# Patient Record
Sex: MALE | Race: OTHER | Employment: UNEMPLOYED | URBAN - METROPOLITAN AREA
[De-identification: names, ages, dates, MRNs, and addresses within clinical notes are randomized per-mention and may not be internally consistent; named-entity substitution may affect disease eponyms.]

---
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
## 2023-11-18 ENCOUNTER — HOSPITAL ENCOUNTER (EMERGENCY)
Age: 23
Discharge: HOME | End: 2023-11-18
Attending: EMERGENCY MEDICINE
Payer: COMMERCIAL

## 2023-11-18 VITALS
DIASTOLIC BLOOD PRESSURE: 107 MMHG | SYSTOLIC BLOOD PRESSURE: 144 MMHG | TEMPERATURE: 97.9 F | WEIGHT: 120 LBS | RESPIRATION RATE: 16 BRPM | HEIGHT: 68 IN | HEART RATE: 98 BPM | BODY MASS INDEX: 18.19 KG/M2 | OXYGEN SATURATION: 98 %

## 2023-11-18 DIAGNOSIS — F10.920 ACUTE ALCOHOLIC INTOXICATION WITHOUT COMPLICATION (HCC): ICD-10-CM

## 2023-11-18 DIAGNOSIS — S01.312A LACERATION OF LEFT EARLOBE, INITIAL ENCOUNTER: Primary | ICD-10-CM

## 2023-11-18 PROCEDURE — 12001 RPR S/N/AX/GEN/TRNK 2.5CM/<: CPT

## 2023-11-18 PROCEDURE — 99283 EMERGENCY DEPT VISIT LOW MDM: CPT

## 2023-11-18 ASSESSMENT — PAIN - FUNCTIONAL ASSESSMENT: PAIN_FUNCTIONAL_ASSESSMENT: NONE - DENIES PAIN

## 2023-11-19 NOTE — DISCHARGE INSTRUCTIONS
Return immediately if any worsening symptoms or any other concerns    Tell us how we did visit: [email or web address]   and let us know about your experience
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
